# Patient Record
Sex: MALE | Race: WHITE | NOT HISPANIC OR LATINO | URBAN - METROPOLITAN AREA
[De-identification: names, ages, dates, MRNs, and addresses within clinical notes are randomized per-mention and may not be internally consistent; named-entity substitution may affect disease eponyms.]

---

## 2017-05-18 PROBLEM — 428283002 HISTORY OF POLYP OF COLON: Status: ACTIVE | Noted: 2017-05-18

## 2017-05-18 PROBLEM — 95570007 KIDNEY STONE: Status: ACTIVE | Noted: 2017-05-18

## 2017-05-18 PROBLEM — 398050005 DIVERTICULAR DISEASE OF COLON: Status: ACTIVE | Noted: 2017-05-18

## 2017-05-18 PROBLEM — 230690007 CEREBROVASCULAR ACCIDENT: Status: ACTIVE | Noted: 2017-05-18

## 2017-05-18 PROBLEM — 4556007 GASTRITIS: Status: ACTIVE | Noted: 2017-05-18

## 2017-05-18 PROBLEM — 22298006 MYOCARDIAL INFARCTION: Status: ACTIVE | Noted: 2017-05-18

## 2017-05-18 PROBLEM — 313436004 TYPE II DIABETES MELLITUS WITHOUT COMPLICATION: Status: ACTIVE | Noted: 2017-05-18

## 2018-08-01 PROBLEM — 42343007 CONGESTIVE HEART FAILURE: Status: ACTIVE | Noted: 2018-08-01

## 2018-08-01 PROBLEM — 38341003 HYPERTENSION: Status: ACTIVE | Noted: 2018-08-01

## 2021-01-18 ENCOUNTER — OFFICE VISIT (OUTPATIENT)
Dept: URBAN - METROPOLITAN AREA CLINIC 13 | Facility: CLINIC | Age: 81
End: 2021-01-18

## 2021-01-20 ENCOUNTER — LAB OUTSIDE AN ENCOUNTER (OUTPATIENT)
Dept: URBAN - METROPOLITAN AREA CLINIC 13 | Facility: CLINIC | Age: 81
End: 2021-01-20

## 2021-01-20 LAB
A/G RATIO: 1.1
ALBUMIN: (no result)
ALKALINE PHOSPHATASE: (no result)
ALT (SGPT): (no result)
AST (SGOT): (no result)
BILIRUBIN, TOTAL: (no result)
BUN/CREATININE RATIO: 17
BUN: (no result)
CALCIUM: (no result)
CARBON DIOXIDE, TOTAL: (no result)
CHLORIDE: (no result)
CREATININE: (no result)
EGFR IF AFRICN AM: (no result)
EGFR IF NONAFRICN AM: (no result)
GLOBULIN, TOTAL: (no result)
GLUCOSE: (no result)
IRON BIND.CAP.(TIBC): (no result)
IRON SATURATION: (no result)
IRON: (no result)
POTASSIUM: (no result)
PROTEIN, TOTAL: (no result)
SODIUM: (no result)
UIBC: (no result)

## 2021-02-09 ENCOUNTER — OFFICE VISIT (OUTPATIENT)
Dept: URBAN - METROPOLITAN AREA TELEHEALTH 2 | Facility: TELEHEALTH | Age: 81
End: 2021-02-09

## 2021-02-22 ENCOUNTER — OFFICE VISIT (OUTPATIENT)
Dept: URBAN - METROPOLITAN AREA CLINIC 46 | Facility: CLINIC | Age: 81
End: 2021-02-22

## 2021-03-10 ENCOUNTER — OFFICE VISIT (OUTPATIENT)
Dept: URBAN - METROPOLITAN AREA MEDICAL CENTER 35 | Facility: MEDICAL CENTER | Age: 81
End: 2021-03-10

## 2021-08-28 ENCOUNTER — TELEPHONE ENCOUNTER (OUTPATIENT)
Dept: URBAN - METROPOLITAN AREA CLINIC 13 | Facility: CLINIC | Age: 81
End: 2021-08-28

## 2021-08-28 RX ORDER — LISINOPRIL 2.5 MG/1
TABLET ORAL
OUTPATIENT
End: 2018-08-01

## 2021-08-28 RX ORDER — WARFARIN 5 MG/1
TABLET ORAL
OUTPATIENT
End: 2018-08-01

## 2021-08-28 RX ORDER — APIXABAN 5 MG/1
TABLET, FILM COATED ORAL
OUTPATIENT
End: 2021-02-09

## 2021-08-28 RX ORDER — PANTOPRAZOLE SODIUM 40 MG/1
TABLET, DELAYED RELEASE ORAL
OUTPATIENT
Start: 2018-08-08 | End: 2019-08-29

## 2021-08-28 RX ORDER — FINASTERIDE 5 MG/1
TABLET, FILM COATED ORAL
OUTPATIENT
End: 2021-02-09

## 2021-08-29 ENCOUNTER — TELEPHONE ENCOUNTER (OUTPATIENT)
Dept: URBAN - METROPOLITAN AREA CLINIC 13 | Facility: CLINIC | Age: 81
End: 2021-08-29

## 2021-08-29 RX ORDER — LISINOPRIL 2.5 MG/1
TABLET ORAL
Status: ACTIVE | COMMUNITY

## 2021-08-29 RX ORDER — NITROGLYCERIN 0.4 MG/1
TABLET SUBLINGUAL
Status: ACTIVE | COMMUNITY

## 2021-08-29 RX ORDER — PANTOPRAZOLE SODIUM 40 MG/1
TABLET, DELAYED RELEASE ORAL
Status: ACTIVE | COMMUNITY

## 2021-08-29 RX ORDER — EPLERENONE 25 MG/1
TABLET, FILM COATED ORAL
Status: ACTIVE | COMMUNITY

## 2021-08-29 RX ORDER — FUROSEMIDE 40 MG/1
TABLET ORAL
Status: ACTIVE | COMMUNITY

## 2021-08-29 RX ORDER — DIGOXIN 125 UG/1
TABLET ORAL
Status: ACTIVE | COMMUNITY

## 2021-08-29 RX ORDER — ATORVASTATIN CALCIUM 10 MG/1
TABLET ORAL
Status: ACTIVE | COMMUNITY

## 2021-08-29 RX ORDER — CARVEDILOL 12.5 MG/1
TABLET, FILM COATED ORAL
Status: ACTIVE | COMMUNITY

## 2021-08-29 RX ORDER — METOLAZONE 5 MG/1
TABLET ORAL
Status: ACTIVE | COMMUNITY

## 2021-08-29 RX ORDER — DOXAZOSIN MESYLATE 4 MG/1
TABLET ORAL
Status: ACTIVE | COMMUNITY

## 2021-09-16 ENCOUNTER — OFFICE VISIT (OUTPATIENT)
Dept: URBAN - METROPOLITAN AREA CLINIC 46 | Facility: CLINIC | Age: 81
End: 2021-09-16
Payer: MEDICARE

## 2021-09-16 VITALS
HEIGHT: 66 IN | HEART RATE: 62 BPM | SYSTOLIC BLOOD PRESSURE: 113 MMHG | BODY MASS INDEX: 22.56 KG/M2 | DIASTOLIC BLOOD PRESSURE: 60 MMHG | WEIGHT: 140.4 LBS | TEMPERATURE: 98.1 F

## 2021-09-16 DIAGNOSIS — D62 ACUTE POST-HEMORRHAGIC ANEMIA: ICD-10-CM

## 2021-09-16 DIAGNOSIS — Z87.11 PERSONAL HISTORY OF PEPTIC ULCER DISEASE: ICD-10-CM

## 2021-09-16 DIAGNOSIS — I48.0 PAROXYSMAL ATRIAL FIBRILLATION: ICD-10-CM

## 2021-09-16 DIAGNOSIS — R19.7 DIARRHEA OF PRESUMED INFECTIOUS ORIGIN: ICD-10-CM

## 2021-09-16 PROBLEM — 282825002: Status: ACTIVE | Noted: 2021-09-16

## 2021-09-16 PROCEDURE — 99215 OFFICE O/P EST HI 40 MIN: CPT | Performed by: INTERNAL MEDICINE

## 2021-09-16 RX ORDER — FUROSEMIDE 20 MG/1
1 TABLET TABLET ORAL ONCE A DAY
Status: ACTIVE | COMMUNITY

## 2021-09-16 RX ORDER — METOLAZONE 5 MG/1
TABLET ORAL
Status: DISCONTINUED | COMMUNITY

## 2021-09-16 RX ORDER — EPLERENONE 25 MG/1
TABLET, FILM COATED ORAL
Status: ACTIVE | COMMUNITY

## 2021-09-16 RX ORDER — FUROSEMIDE 40 MG/1
TABLET ORAL
Status: DISCONTINUED | COMMUNITY

## 2021-09-16 RX ORDER — ATORVASTATIN CALCIUM 10 MG/1
TABLET ORAL
Status: ACTIVE | COMMUNITY

## 2021-09-16 RX ORDER — LISINOPRIL 2.5 MG/1
TABLET ORAL
Status: ACTIVE | COMMUNITY

## 2021-09-16 RX ORDER — CARVEDILOL 12.5 MG/1
TABLET, FILM COATED ORAL
Status: DISCONTINUED | COMMUNITY

## 2021-09-16 RX ORDER — PANTOPRAZOLE SODIUM 40 MG/1
TABLET, DELAYED RELEASE ORAL
Status: ACTIVE | COMMUNITY

## 2021-09-16 RX ORDER — DIGOXIN 125 UG/1
TABLET ORAL
Status: ACTIVE | COMMUNITY

## 2021-09-16 RX ORDER — METRONIDAZOLE 500 MG/1
1 TABLET TABLET, FILM COATED ORAL BID
Qty: 14 TABLET | Refills: 0 | OUTPATIENT
Start: 2021-09-16 | End: 2021-09-23

## 2021-09-16 RX ORDER — PANTOPRAZOLE SODIUM 40 MG/1
TABLET, DELAYED RELEASE ORAL
OUTPATIENT

## 2021-09-16 RX ORDER — DOXAZOSIN MESYLATE 4 MG/1
TABLET ORAL
Status: ACTIVE | COMMUNITY

## 2021-09-16 RX ORDER — NITROGLYCERIN 0.4 MG/1
TABLET SUBLINGUAL
Status: ACTIVE | COMMUNITY

## 2021-09-16 RX ORDER — VITAMIN A 2400 MCG
1 TABLET CAPSULE ORAL ONCE A DAY
Status: DISCONTINUED | COMMUNITY

## 2021-09-16 NOTE — HPI-TODAY'S VISIT:
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  Re-admitted 2/2021 with recurrent bleed and was able to be treated endoscpically.  Plan was for a Watchman procdeure in the future or Apriclip so pt could be off anti-coagulants and has been told not a good candidate.  Last CBC 3/2021 wtih Hgb 9.5. Now presents for a follow up: still on Pantoprazole. Not taking ASA or ant-coagulants. No overt GI bleeding or distress. Has not had repeat CBC. Does state in the past 6 weeks had a flare of chronic diarrhea where can go 5 times per day wtith urgency. Denies abx or med change. BRAT diet has helped.

## 2021-11-16 ENCOUNTER — OFFICE VISIT (OUTPATIENT)
Dept: URBAN - METROPOLITAN AREA CLINIC 46 | Facility: CLINIC | Age: 81
End: 2021-11-16
Payer: MEDICARE

## 2021-11-16 VITALS
SYSTOLIC BLOOD PRESSURE: 102 MMHG | DIASTOLIC BLOOD PRESSURE: 60 MMHG | WEIGHT: 143 LBS | HEART RATE: 62 BPM | TEMPERATURE: 98 F | BODY MASS INDEX: 22.98 KG/M2 | HEIGHT: 66 IN

## 2021-11-16 DIAGNOSIS — R19.7 DIARRHEA OF PRESUMED INFECTIOUS ORIGIN: ICD-10-CM

## 2021-11-16 DIAGNOSIS — D62 ACUTE POSTHEMORRHAGIC ANEMIA: ICD-10-CM

## 2021-11-16 DIAGNOSIS — R42 DIZZINESS: ICD-10-CM

## 2021-11-16 PROCEDURE — 99214 OFFICE O/P EST MOD 30 MIN: CPT | Performed by: INTERNAL MEDICINE

## 2021-11-16 RX ORDER — PANTOPRAZOLE SODIUM 40 MG/1
TABLET, DELAYED RELEASE ORAL
Status: ACTIVE | COMMUNITY

## 2021-11-16 RX ORDER — PANTOPRAZOLE SODIUM 40 MG/1
1 TABLET TABLET, DELAYED RELEASE ORAL ONCE A DAY
Qty: 90 TABLET | Refills: 3

## 2021-11-16 RX ORDER — LISINOPRIL 2.5 MG/1
TABLET ORAL
Status: DISCONTINUED | COMMUNITY

## 2021-11-16 RX ORDER — ATORVASTATIN CALCIUM 10 MG/1
TABLET ORAL
Status: ACTIVE | COMMUNITY

## 2021-11-16 RX ORDER — CHOLESTYRAMINE 4 G/5G
1 SCOOP POWDER, FOR SUSPENSION ORAL ONCE A DAY
Qty: 30 | Refills: 3 | OUTPATIENT
Start: 2021-11-16

## 2021-11-16 RX ORDER — DOXAZOSIN MESYLATE 4 MG/1
TABLET ORAL
Status: ACTIVE | COMMUNITY

## 2021-11-16 RX ORDER — ASPIRIN 81 MG/1
1 TABLET TABLET, COATED ORAL ONCE A DAY
Status: ACTIVE | COMMUNITY

## 2021-11-16 RX ORDER — FUROSEMIDE 20 MG/1
1 TABLET TABLET ORAL ONCE A DAY
Status: ACTIVE | COMMUNITY

## 2021-11-16 RX ORDER — EPLERENONE 25 MG/1
TABLET, FILM COATED ORAL
Status: ACTIVE | COMMUNITY

## 2021-11-16 RX ORDER — DIGOXIN 125 UG/1
TABLET ORAL
Status: ACTIVE | COMMUNITY

## 2021-11-16 RX ORDER — NITROGLYCERIN 0.4 MG/1
TABLET SUBLINGUAL
Status: ACTIVE | COMMUNITY

## 2021-11-16 NOTE — HPI-TODAY'S VISIT:
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  Re-admitted 2/2021 with recurrent bleed and was able to be treated endoscpically.  Plan was for a Watchman procdeure in the future or Apriclip so pt could be off anti-coagulants and has since been told not a good candidate.  CBC 3/2021 wtih Hgb 9.5. Presented for a follow up 9/2021 and still on Pantoprazole and not taking ASA or ant-coagulants. Did complain of 5 weeks of daily diarrhea up to 6 per day. Stool checked and elevated fecal calpro without infectious agents and pt started on Flagyl for 10 days with Culturelle. Repeat Hgb 9/30/2021 was 11.5. Pt now presents for a six week follow up. States diarrhea did not change and still with bouts of diarrhea that only stop with Imodium and binds bowels for 2 days then cyclte repeats. No bleeding. No pain. Has had dizziness and a fall 11/9/2021 and to ER and had a  head laceration. No CBC or CMP rechecked. BP has been low with systolic in 100's and Cardiology has been adjusting anti-HTN and Beta Blockers for.

## 2021-11-16 NOTE — PHYSICAL EXAM CONSTITUTIONAL:
well developed, poorly nourished , in no acute distress , ambulating with difficulty (using walker), normal communication ability

## 2022-01-18 ENCOUNTER — OFFICE VISIT (OUTPATIENT)
Dept: URBAN - METROPOLITAN AREA CLINIC 46 | Facility: CLINIC | Age: 82
End: 2022-01-18

## 2022-02-01 ENCOUNTER — OFFICE VISIT (OUTPATIENT)
Dept: URBAN - METROPOLITAN AREA CLINIC 46 | Facility: CLINIC | Age: 82
End: 2022-02-01
Payer: MEDICARE

## 2022-02-01 VITALS
DIASTOLIC BLOOD PRESSURE: 61 MMHG | TEMPERATURE: 98.1 F | HEART RATE: 69 BPM | WEIGHT: 147.4 LBS | HEIGHT: 66 IN | SYSTOLIC BLOOD PRESSURE: 109 MMHG | BODY MASS INDEX: 23.69 KG/M2

## 2022-02-01 DIAGNOSIS — K74.4 SECONDARY BILIARY CIRRHOSIS: ICD-10-CM

## 2022-02-01 DIAGNOSIS — D62 ACUTE POSTHEMORRHAGIC ANEMIA: ICD-10-CM

## 2022-02-01 DIAGNOSIS — R19.7 DIARRHEA OF PRESUMED INFECTIOUS ORIGIN: ICD-10-CM

## 2022-02-01 PROCEDURE — 99214 OFFICE O/P EST MOD 30 MIN: CPT | Performed by: INTERNAL MEDICINE

## 2022-02-01 RX ORDER — CHOLESTYRAMINE 4 G/5G
1 SCOOP POWDER, FOR SUSPENSION ORAL ONCE A DAY
Qty: 30 | Refills: 3 | Status: ACTIVE | COMMUNITY
Start: 2021-11-16

## 2022-02-01 RX ORDER — FUROSEMIDE 20 MG/1
1 TABLET TABLET ORAL ONCE A DAY
Status: ACTIVE | COMMUNITY

## 2022-02-01 RX ORDER — NITROGLYCERIN 0.4 MG/1
TABLET SUBLINGUAL
Status: ACTIVE | COMMUNITY

## 2022-02-01 RX ORDER — ATORVASTATIN CALCIUM 10 MG/1
TABLET ORAL
Status: ACTIVE | COMMUNITY

## 2022-02-01 RX ORDER — PANTOPRAZOLE SODIUM 40 MG/1
1 TABLET TABLET, DELAYED RELEASE ORAL ONCE A DAY
Qty: 90 TABLET | Refills: 3 | Status: ACTIVE | COMMUNITY

## 2022-02-01 RX ORDER — FUROSEMIDE 20 MG/1
1 TABLET TABLET ORAL ONCE A DAY
OUTPATIENT

## 2022-02-01 RX ORDER — DOXAZOSIN MESYLATE 4 MG/1
TABLET ORAL
Status: ACTIVE | COMMUNITY

## 2022-02-01 RX ORDER — EPLERENONE 25 MG/1
TABLET, FILM COATED ORAL
Status: ACTIVE | COMMUNITY

## 2022-02-01 RX ORDER — LACTOBACILLUS RHAMNOSUS GG 10B CELL
AS DIRECTED CAPSULE ORAL
Status: ACTIVE | COMMUNITY

## 2022-02-01 RX ORDER — CHOLESTYRAMINE 4 G/5G
1 SCOOP POWDER, FOR SUSPENSION ORAL ONCE A DAY
OUTPATIENT
Start: 2021-11-16

## 2022-02-01 RX ORDER — ASPIRIN 81 MG/1
1 TABLET TABLET, COATED ORAL ONCE A DAY
Status: ACTIVE | COMMUNITY

## 2022-02-01 RX ORDER — DIGOXIN 125 UG/1
TABLET ORAL
Status: ACTIVE | COMMUNITY

## 2022-02-01 RX ORDER — PANTOPRAZOLE SODIUM 40 MG/1
1 TABLET TABLET, DELAYED RELEASE ORAL ONCE A DAY
OUTPATIENT

## 2022-02-01 NOTE — PHYSICAL EXAM GASTROINTESTINAL
Abdomen , soft, nontender, mild distention with fluid wave. no guarding or rigidity , no masses palpable , normal bowel sounds , Liver and Spleen , no hepatomegaly present , no hepatosplenomegaly , liver nontender , spleen not palpable

## 2022-02-01 NOTE — PHYSICAL EXAM CONSTITUTIONAL:
well developed, well nourished , in no acute distress , ambulating with walker , normal communication ability. Frail

## 2022-02-01 NOTE — HPI-TODAY'S VISIT:
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  Re-admitted 2/2021 with recurrent bleed and was able to be treated endoscpically.  Plan was for a Watchman procdeure in the future or Apriclip so pt could be off anti-coagulants and was later told not a good candidate. Presented for a follow up 9/2021 and still on Pantoprazole and not taking ASA or ant-coagulants. Did complain of 5 weeks of daily diarrhea up to 6 per day. Stool checked and elevated fecal calpro without infectious agents and pt started on Flagyl for 10 days with Culturelle. Repeat Hgb 9/30/2021 was 11.5.  Seen for a follow up 11/2021 and despite therapy diarrhea did not change. Plan was to start Questran 4gm once a day. Repeat labs 11/2021 with Hgb 10.2; iron 54; TIBC 314; iron sat 17%; Bili 1.7; Albumin 3.9; LFT's wnl. Now prsents for a follow up: 3 BM per day and more formed on Questran once a day. No overt bleeding. Mild abdominal distention; no SUZETTE. Dizziness mildly better. No HE or jaundice.

## 2022-06-03 ENCOUNTER — WEB ENCOUNTER (OUTPATIENT)
Dept: URBAN - METROPOLITAN AREA CLINIC 46 | Facility: CLINIC | Age: 82
End: 2022-06-03

## 2022-06-07 ENCOUNTER — OFFICE VISIT (OUTPATIENT)
Dept: URBAN - METROPOLITAN AREA CLINIC 46 | Facility: CLINIC | Age: 82
End: 2022-06-07

## 2022-06-09 ENCOUNTER — OFFICE VISIT (OUTPATIENT)
Dept: URBAN - METROPOLITAN AREA CLINIC 46 | Facility: CLINIC | Age: 82
End: 2022-06-09
Payer: MEDICARE

## 2022-06-09 VITALS
WEIGHT: 137.6 LBS | SYSTOLIC BLOOD PRESSURE: 98 MMHG | HEIGHT: 66 IN | DIASTOLIC BLOOD PRESSURE: 52 MMHG | HEART RATE: 60 BPM | TEMPERATURE: 98.2 F | BODY MASS INDEX: 22.11 KG/M2

## 2022-06-09 DIAGNOSIS — K74.4 SECONDARY BILIARY CIRRHOSIS: ICD-10-CM

## 2022-06-09 DIAGNOSIS — D62 ACUTE POSTHEMORRHAGIC ANEMIA: ICD-10-CM

## 2022-06-09 DIAGNOSIS — R19.7 DIARRHEA OF PRESUMED INFECTIOUS ORIGIN: ICD-10-CM

## 2022-06-09 PROBLEM — 43240000: Status: ACTIVE | Noted: 2021-11-16

## 2022-06-09 PROCEDURE — 99214 OFFICE O/P EST MOD 30 MIN: CPT | Performed by: INTERNAL MEDICINE

## 2022-06-09 RX ORDER — DIGOXIN 125 UG/1
TABLET ORAL
Status: ACTIVE | COMMUNITY

## 2022-06-09 RX ORDER — PANTOPRAZOLE SODIUM 40 MG/1
1 TABLET TABLET, DELAYED RELEASE ORAL ONCE A DAY
OUTPATIENT

## 2022-06-09 RX ORDER — DOXAZOSIN MESYLATE 4 MG/1
TABLET ORAL
Status: ACTIVE | COMMUNITY

## 2022-06-09 RX ORDER — NITROGLYCERIN 0.4 MG/1
TABLET SUBLINGUAL
Status: ACTIVE | COMMUNITY

## 2022-06-09 RX ORDER — LACTOBACILLUS RHAMNOSUS GG 10B CELL
AS DIRECTED CAPSULE ORAL
Status: ACTIVE | COMMUNITY

## 2022-06-09 RX ORDER — PANTOPRAZOLE SODIUM 40 MG/1
1 TABLET TABLET, DELAYED RELEASE ORAL ONCE A DAY
Status: ACTIVE | COMMUNITY

## 2022-06-09 RX ORDER — EPLERENONE 25 MG/1
TABLET, FILM COATED ORAL
Status: ACTIVE | COMMUNITY

## 2022-06-09 RX ORDER — ATORVASTATIN CALCIUM 10 MG/1
TABLET ORAL
Status: ACTIVE | COMMUNITY

## 2022-06-09 RX ORDER — CHOLESTYRAMINE 4 G/5G
1 SCOOP POWDER, FOR SUSPENSION ORAL ONCE A DAY
Status: ACTIVE | COMMUNITY
Start: 2021-11-16

## 2022-06-09 RX ORDER — FUROSEMIDE 20 MG/1
1 TABLET TABLET ORAL ONCE A DAY
OUTPATIENT

## 2022-06-09 RX ORDER — ASPIRIN 81 MG/1
1 TABLET TABLET, COATED ORAL ONCE A DAY
Status: ACTIVE | COMMUNITY

## 2022-06-09 RX ORDER — FUROSEMIDE 20 MG/1
1 TABLET TABLET ORAL ONCE A DAY
Status: ACTIVE | COMMUNITY

## 2022-06-09 NOTE — HPI-TODAY'S VISIT:
;      [facility] [place] Emergency Department  [address], [place] [address]  [phone]     Clinical Summary     PERSON INFORMATION   Name RONNY CABALLERO Age  71 Years  1949 12:00 AM   Acct# [id number] Sex Female Phone [phone]   Dispo Type Still a patient - 30 Arrival 2020 8:02 AM Checkout 2020 12:16 PM    Address: [address] [place] [address]      Visit Reason LOWER ABD PAIN     ED Physician Note     Patient:   RONNY CABALLERO            MRN: [id number]            FIN: [id number]               Age:   71 years     Sex:  Female     :  1949   Associated Diagnoses:   None   Author:   Jose SALAS, Domenico HERRING      Basic Information   Additional information: Chief Complaint from Nursing Triage Note : Chief Complaint   2020 9:30           Chief Complaint           LLQ pain  .      History of Present Illness   Patient is 71-year-old lady, history of diverticulitis who comes in with a chief complaint of worsening abdominal pain over the last 3 days.  Patient describes it as starting in her mid abdomen now in her lower abdomen.  Patient has some mild nausea without vomiting.  No fevers or chills.  Patient otherwise been in her regular state of health.  Patient denies chest pain or shortness of breath..        Review of Systems   Constitutional symptoms:  No fever,    Skin symptoms:  No rash,    Eye symptoms:  No recent vision problems,    ENMT symptoms:  No sore throat,    Respiratory symptoms:  No shortness of breath,    Cardiovascular symptoms:  No chest pain,    Gastrointestinal symptoms:  Negative except as documented in HPI.   Genitourinary symptoms:  No dysuria,    Musculoskeletal symptoms:  No back pain,    Neurologic symptoms:  No headache,    Hematologic/Lymphatic symptoms:  Bleeding tendency negative,       Past Medical/ Family/ Social History      Medical history   Reviewed as documented in chart.   see above.   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  Re-admitted 2/2021 with recurrent bleed and was able to be treated endoscpically.  Plan was for a Watchman procdeure in the future or Apriclip so pt could be off anti-coagulants and was later told not a good candidate.   Presented for a follow up 9/2021 and still on Pantoprazole and not taking ASA or ant-coagulants. Did complain of 5 weeks of daily diarrhea up to 6 per day. Stool checked and elevated fecal calpro without infectious agents and pt started on Flagyl for 10 days with Culturelle. Repeat Hgb 9/30/2021 was 11.5.   Seen for a follow up 11/2021 and despite therapy diarrhea did not change. Plan was to start Questran 4gm once a day. Repeat labs 11/2021 with Hgb 10.2; iron 54; TIBC 314; iron sat 17%; Bili 1.7; Albumin 3.9; LFT's wnl.   Last seen 2/2022 and 3 BM per day and more formed on Questran once a day. No overt bleeding. Mild abdominal distention; no SUZETTE. Dizziness mildly better. No HE or jaundice. No change in medical therapy. Pt now presents for a follow up. Pt stopped Questran as not helping and not having diarrhea. States PLT were low at PCP and referred to hematology and having testing. Pt still wtih LE edema and mild ascites on exam but weight stable. Taking 40mg lasix daily and has prn Metalazone but has  low BP and scared to take. No signs of confusion. No overt GI bleeding Appendectomy, hysterectomy.   Family history: Not significant.   Social history: Alcohol use: Denies, Tobacco use: Denies.      Physical Examination               Vital Signs   Vital Signs   9/24/2020 10:00          Peripheral Pulse Rate     82 bpm                             Respiratory Rate          16 br/min                             Systolic Blood Pressure   127 mmHg                             Diastolic Blood Pressure  60 mmHg                             Mean Arterial Pressure    82 mmHg                             Oxygen Saturation         99 %    9/24/2020 8:50           Peripheral Pulse Rate     84 bpm                             Respiratory Rate          16 br/min                             Systolic Blood Pressure   129 mmHg                             Diastolic Blood Pressure  80 mmHg                             Mean Arterial Pressure    96 mmHg                             Oxygen Saturation         97 %    9/24/2020 8:07           Temperature Tympanic      97.5 F  LOW                             Peripheral Pulse Rate     106 bpm  HI                             Respiratory Rate          16 br/min                             Systolic Blood Pressure   123 mmHg                             Diastolic Blood Pressure  72 mmHg                             Mean Arterial Pressure    89 mmHg                             Oxygen Saturation         97 %  .               Per nurse's notes.   Pulse Ox 97% on Room Air which is normal for this patient.   Vital Signs were reviewed.   General:  Alert, no acute distress.    Skin:  Warm, dry.    Head:  Normocephalic, atraumatic.    Neck:  Supple, trachea midline.    Eye:  Extraocular movements are intact.   Ears, nose, mouth and throat:  Oral mucosa moist.   Cardiovascular:  Regular rate and rhythm, S1, S2.    Respiratory:  Lungs are clear to auscultation, respirations are non-labored, Symmetrical chest wall expansion.    Gastrointestinal:  Soft, Non distended, Mild tenderness  left lower quadrant no obvious guarding or rebound, no masses.    Musculoskeletal:  Normal ROM, no swelling, no deformity.    Neurological:  Alert and oriented to person, place, time, and situation, No focal neurological deficit observed.    Psychiatric:  Appropriate mood & affect.      Medical Decision Making   Results review:  Lab results : Lab View   9/24/2020 10:06          UA Appear                 Clear                             UA Color                  Yellow                             UA pH                     6.5                             UA Spec Grav              <=1.005                             UA Glucose                Negative                             UA Bili                   Negative                             UA Ketones                Negative                             UA Blood                  Negative                             UA Protein                Negative                             UA Urobilinogen           0.2 mg/dL                             UA Nitrite                Negative                             UA Leuk Est               Trace                             UA Epithelial             Rare                             UA RBC                    Rare                             UA WBC                    Rare                             UA Bacteria               Few    9/24/2020 9:22           CRCL Est.                 48.92 mL/min    9/24/2020 8:45           Glucose Lvl               116 mg/dL  HI                             BUN                       29 mg/dL  HI                             Creatinine                1.28 mg/dL                             eGFR AfrAmer              50 mL/min/1.73m2  NA                             eGFR NonAfrAmer           41 mL/min/1.73m2  NA                             Sodium                    140 mEq/L                             Potassium                 4.7 mEq/L                             Chloride                  106 mEq/L                              TCO2                      24 mEq/L                             AGAP                      15 mEq/L                             Calcium                   9.7 mg/dL                             Alk Phos                  98 unit/L                             Bili Total                0.6 mg/dL                             Total Protein             7.5 g/dL                             Albumin                   3.8 g/dL                             Globulin_                 3.7 g/dL                             A/G Ratio_                1.0  NA                             AST/GOT                   19 unit/L                             ALT/GPT                   16 unit/L                             Lipase Level              17 unit/L                             WBC                       10.0 K/cumm                             RBC                       4.05 M/cumm                             Hgb                       11.6 g/dL                             Hct                       36 %                             MCV                       89 FL                             MCH                       29 pg                             MCHC                      32 g/dL                             RDW                       12.8 %                             Platelet                  288 K/cumm                             NRBC                      0.0 %                             Abs Neutro                7.1 K/cumm                             Neutrophil                71 %  NA                             Abs Lymph                 1.9 K/cumm                             Lymphocyte                19 %  NA                             Abs Mono                  0.8 K/cumm                             Monocyte                  8 %  NA                             Immature Gran             0.3 %                             Eosinophil                1 %                             Basophil                  0 %  .   Notes:   Patient seen and evaluated as noted above.    Patient's history and physical consistent with acute abdominal pain of unclear etiology.  Multiple diagnoses were considered and evaluated including recurrence of her diverticulitis, other intra-abdominal emergencies.  Patient has no evidence of any hernia.    Patient was treated with IV fluids and pain medication.    Laboratory evaluation was unremarkable.    CT scan shows acute diverticulitis.    Patient was started on broad-spectrum antibiotics, I spoke to the hospitalist who will admit the patient with GI and surgery consultation.    Patient remained hemodynamically stable here in the emergency department.      Impression and Plan   Diagnosis   Acute diverticulitis   Plan   Condition: Improved.    Disposition: Admit to Inpatient Unit.        ED Time Seen By Provider Entered On:  9/24/2020 8:25     Performed On:  9/24/2020 8:25  by Domenico Garcia MD               Time Seen By Provider   Time Seen by Provider :   9/24/2020 8:10    Domenico Garcia MD - 9/24/2020 8:25           VITALS INFORMATION  Vitals/Ht/Wt  Temperature Tympanic:  97.5 F  Peripheral Pulse Rate:  106 bpm  Respiratory Rate:  16 br/min  Oxygen Saturation:  97 %  Oxygen Therapy:  Room air  Systolic Blood Pressure:  123 mmHg  Diastolic Blood Pressure:  72 mmHg  Mean Arterial Pressure:  89 mmHg  Height:  160 cm  Weight:  78 kg  Weight Type:  Measured  Weight Method:  Standing  ED Hand-Off Communication  ED Hand-Off Reason:  In Hospital Transfer  ED Hand-Off Reason / In Hospital:  SBAR reviewed during report  Patient on Cardiac Monitor:  No  Sitter Present:  No  Potential Core Measure:  N/A  Hand-off Report Given to:  Emily Perales       MEDICAL INFORMATION   Allergy Info:          Zithromax; penicillin; tetracycline             Prescriptions:                  Home Meds Display   calcium carbonate (Caltrate 600 mg oral tablet) 600 mg = 1 tab, PO, Tablet, qDay, # 60 tab   cholecalciferol (Vitamin D3  1000 units oral tablet) 4,000 Int. Units = 4 tab, PO, Tab, qDay, # 30 tab(s)   hydroCHLOROthiazide (hydroCHLOROthiazide 12.5 mg oral tablet) 12.5 mg = 1 tab(s), PO, Tab, qDay, # 30 tab(s)   levothyroxine (levothyroxine 112 mcg (0.112 mg) oral tablet) 112 mcg = 1 tab, PO, Tablet, qDay, # 30 tab   lisinopril (lisinopril 20 mg oral tablet) 20 mg = 1 tab(s), PO, Tab, qDay, # 30 tab(s)   meclizine (meclizine 12.5 mg oral tablet) 12.5 mg = 1 tab(s), PO, Tab, qDay, PRN for dizziness, # 30 tab(s)   omeprazole (omeprazole 40 mg oral delayed release capsule) 40 mg = 1 cap, PO, EC Capsule, BID, # 90 cap   rosuvastatin (rosuvastatin 5 mg oral tablet) 5 mg = 1 tab(s), PO, Tab, qDay, # 30 tab(s)   tiZANidine (tiZANidine 2 mg oral tablet) 2 mg = 1 tab, PO, Tab, TID, PRN Muscle spasm          DISCHARGE INFORMATION   Discharge Disposition: Still a patient - 30     PATIENT EDUCATION INFORMATION   Instructions:          Follow up:            DIAGNOSIS

## 2022-11-07 ENCOUNTER — ERX REFILL RESPONSE (OUTPATIENT)
Dept: URBAN - METROPOLITAN AREA CLINIC 46 | Facility: CLINIC | Age: 82
End: 2022-11-07

## 2022-11-07 RX ORDER — PANTOPRAZOLE 40 MG/1
TAKE ONE TABLET BY MOUTH DAILY TABLET, DELAYED RELEASE ORAL
Qty: 90 TABLET | Refills: 3 | OUTPATIENT

## 2022-11-07 RX ORDER — PANTOPRAZOLE SODIUM 40 MG/1
1 TABLET TABLET, DELAYED RELEASE ORAL ONCE A DAY
OUTPATIENT

## 2022-12-08 ENCOUNTER — OFFICE VISIT (OUTPATIENT)
Dept: URBAN - METROPOLITAN AREA CLINIC 46 | Facility: CLINIC | Age: 82
End: 2022-12-08

## 2023-01-06 ENCOUNTER — DASHBOARD ENCOUNTERS (OUTPATIENT)
Age: 83
End: 2023-01-06

## 2023-01-06 ENCOUNTER — OFFICE VISIT (OUTPATIENT)
Dept: URBAN - METROPOLITAN AREA CLINIC 46 | Facility: CLINIC | Age: 83
End: 2023-01-06
Payer: MEDICARE

## 2023-01-06 VITALS
WEIGHT: 138.2 LBS | HEART RATE: 60 BPM | HEIGHT: 66 IN | SYSTOLIC BLOOD PRESSURE: 96 MMHG | BODY MASS INDEX: 22.21 KG/M2 | TEMPERATURE: 97.7 F | DIASTOLIC BLOOD PRESSURE: 53 MMHG

## 2023-01-06 DIAGNOSIS — K74.4 SECONDARY BILIARY CIRRHOSIS: ICD-10-CM

## 2023-01-06 DIAGNOSIS — Z87.11 PERSONAL HISTORY OF PEPTIC ULCER DISEASE: ICD-10-CM

## 2023-01-06 DIAGNOSIS — D50.8 OTHER IRON DEFICIENCY ANEMIA: ICD-10-CM

## 2023-01-06 DIAGNOSIS — I50.42 CHRONIC COMBINED SYSTOLIC AND DIASTOLIC CONGESTIVE HEART FAILURE, NYHA CLASS 4: ICD-10-CM

## 2023-01-06 PROBLEM — 266998003: Status: ACTIVE | Noted: 2023-01-06

## 2023-01-06 PROBLEM — 87522002: Status: ACTIVE | Noted: 2023-01-06

## 2023-01-06 PROBLEM — 153941000119100: Status: ACTIVE | Noted: 2023-01-06

## 2023-01-06 PROBLEM — 12368000: Status: ACTIVE | Noted: 2022-02-01

## 2023-01-06 PROCEDURE — 99214 OFFICE O/P EST MOD 30 MIN: CPT | Performed by: INTERNAL MEDICINE

## 2023-01-06 RX ORDER — ATORVASTATIN CALCIUM 10 MG/1
TABLET ORAL
Status: ACTIVE | COMMUNITY

## 2023-01-06 RX ORDER — FUROSEMIDE 40 MG/1
1 TABLET TABLET ORAL TWICE A DAY
Status: ACTIVE | COMMUNITY

## 2023-01-06 RX ORDER — EPLERENONE 25 MG/1
TABLET, FILM COATED ORAL
Status: ACTIVE | COMMUNITY

## 2023-01-06 RX ORDER — CHOLESTYRAMINE 4 G/5G
1 SCOOP POWDER, FOR SUSPENSION ORAL ONCE A DAY
Status: ACTIVE | COMMUNITY
Start: 2021-11-16

## 2023-01-06 RX ORDER — PANTOPRAZOLE 40 MG/1
TAKE ONE TABLET BY MOUTH DAILY TABLET, DELAYED RELEASE ORAL
Qty: 90 TABLET | Refills: 3 | Status: ACTIVE | COMMUNITY

## 2023-01-06 RX ORDER — LACTOBACILLUS RHAMNOSUS GG 10B CELL
AS DIRECTED CAPSULE ORAL
Status: ACTIVE | COMMUNITY

## 2023-01-06 RX ORDER — DIGOXIN 125 UG/1
TABLET ORAL
Status: ACTIVE | COMMUNITY

## 2023-01-06 RX ORDER — DOXAZOSIN MESYLATE 1 MG/1
1 TABLET TABLET ORAL ONCE A DAY
Status: ACTIVE | COMMUNITY

## 2023-01-06 RX ORDER — ASPIRIN 81 MG/1
1 TABLET TABLET, COATED ORAL ONCE A DAY
Status: ACTIVE | COMMUNITY

## 2023-01-06 RX ORDER — NITROGLYCERIN 0.4 MG/1
TABLET SUBLINGUAL
Status: ACTIVE | COMMUNITY

## 2023-01-06 RX ORDER — METOLAZONE 5 MG/1
1 TABLET TABLET ORAL
Status: ACTIVE | COMMUNITY

## 2023-01-06 NOTE — PHYSICAL EXAM CONSTITUTIONAL:
well developed, poorly nourished , in no acute distress , ambulating with walker , normal communication ability

## 2023-01-06 NOTE — HPI-TODAY'S VISIT:
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  Re-admitted 2/2021 with recurrent bleed and was able to be treated endoscpically.  Plan was for a Watchman procdeure in the future or Apriclip so pt could be off anti-coagulants and was later told not a good candidate.   Presented for a follow up 9/2021 and still on Pantoprazole and not taking ASA or ant-coagulants. Did complain of 5 weeks of daily diarrhea up to 6 per day. Stool checked and elevated fecal calpro without infectious agents and pt started on Flagyl for 10 days with Culturelle. Repeat Hgb 9/30/2021 was 11.5.   Seen for a follow up 11/2021 and despite therapy diarrhea did not change. Plan was to start Questran 4gm once a day. Repeat labs 11/2021 with Hgb 10.2; iron 54; TIBC 314; iron sat 17%; Bili 1.7; Albumin 3.9; LFT's wnl.   Seen for a follow up 2/2022 and 3 BM per day and more formed on Questran once a day. No overt bleeding. Mild abdominal distention; no SUZETTE. Dizziness mildly better. No HE or jaundice. No change in medical therapy.  6/2022:  Pt now presents for a follow up. Pt stopped Questran as not helping and not having diarrhea. States PLT were low at PCP and referred to hematology and having testing. Pt still wtih LE edema and mild ascites on exam but weight stable. Taking 40mg lasix daily and has prn Metalazone but has  low BP and scared to take. No signs of confusion. No overt GI bleeding. Plan was to continue PPI; continue lasix 20mg daily. No repeat endoscopy due to poor overall health  1/6/2023: Pt now presents for a follow up. Having LE edema and seeing wound care; mild ascites. No CP or SUZETTE. No overt bleeding. Mild memory loss but no overt hepatic encephalopathy.  Labs 11/2022 with Cr 1.55; LFT's wnl; Albumin3.5; Hgb 10.9 and ; PLT 78K; Iron levels wnl and TIBC 260; INR 1.8. States Cardiology managing fluid balance and wife states he is not gaining weight. 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